# Patient Record
Sex: FEMALE | URBAN - METROPOLITAN AREA
[De-identification: names, ages, dates, MRNs, and addresses within clinical notes are randomized per-mention and may not be internally consistent; named-entity substitution may affect disease eponyms.]

---

## 2022-05-25 ENCOUNTER — APPOINTMENT (RX ONLY)
Dept: URBAN - METROPOLITAN AREA CLINIC 12 | Facility: CLINIC | Age: 47
Setting detail: DERMATOLOGY
End: 2022-05-25

## 2022-05-25 DIAGNOSIS — L59.0 ERYTHEMA AB IGNE [DERMATITIS AB IGNE]: ICD-10-CM

## 2022-05-25 PROCEDURE — ? DIAGNOSIS COMMENT

## 2022-05-25 PROCEDURE — 99202 OFFICE O/P NEW SF 15 MIN: CPT

## 2022-05-25 PROCEDURE — ? OTHER

## 2022-05-25 PROCEDURE — ? TREATMENT REGIMEN

## 2022-05-25 PROCEDURE — ? COUNSELING

## 2022-05-25 ASSESSMENT — LOCATION SIMPLE DESCRIPTION DERM: LOCATION SIMPLE: LEFT KNEE

## 2022-05-25 ASSESSMENT — LOCATION ZONE DERM: LOCATION ZONE: LEG

## 2022-05-25 ASSESSMENT — LOCATION DETAILED DESCRIPTION DERM: LOCATION DETAILED: LEFT KNEE

## 2022-05-25 NOTE — PROCEDURE: TREATMENT REGIMEN
Samples Given: cosmetic to camoflauge
Plan: reiterate to pt that if any changes in location, pattern, spread - particularly if develops w/o use of heating devise she should follow up for biopsy.  Defers biopsy at todays visit.
Detail Level: Zone

## 2022-05-25 NOTE — PROCEDURE: OTHER
Detail Level: Zone
Note Text (......Xxx Chief Complaint.): This diagnosis correlates with the
Render Risk Assessment In Note?: no
Other (Free Text): onset this winter after use of a space heater next to her leg.  UNCHANGED since onset this winter\\nno change in skin discoloration or pattern since d/c.\\nasymptomatic. \\ndenies hx of connective tissue disease.\\nno hx of miscarriages.\\npt has been evaulated by vascular doctor who referred here as a skin problem (not vascular).